# Patient Record
Sex: FEMALE | Race: WHITE | Employment: FULL TIME | URBAN - METROPOLITAN AREA
[De-identification: names, ages, dates, MRNs, and addresses within clinical notes are randomized per-mention and may not be internally consistent; named-entity substitution may affect disease eponyms.]

---

## 2017-09-12 ENCOUNTER — TELEPHONE (OUTPATIENT)
Dept: UROLOGY | Facility: HOSPITAL | Age: 47
End: 2017-09-12

## 2017-09-12 RX ORDER — LOSARTAN POTASSIUM 50 MG/1
25 TABLET ORAL DAILY
COMMUNITY

## 2017-09-12 NOTE — TELEPHONE ENCOUNTER
History received and processed - referred by Dr Momo Davis for KIEL and UUI - no previous treatment and is interested in surgical correction

## 2017-09-20 ENCOUNTER — OFFICE VISIT (OUTPATIENT)
Dept: UROLOGY | Facility: HOSPITAL | Age: 47
End: 2017-09-20
Attending: OBSTETRICS & GYNECOLOGY
Payer: COMMERCIAL

## 2017-09-20 VITALS
SYSTOLIC BLOOD PRESSURE: 130 MMHG | WEIGHT: 168 LBS | BODY MASS INDEX: 31.72 KG/M2 | HEIGHT: 61 IN | DIASTOLIC BLOOD PRESSURE: 80 MMHG

## 2017-09-20 DIAGNOSIS — N39.3 FEMALE STRESS INCONTINENCE: Primary | ICD-10-CM

## 2017-09-20 DIAGNOSIS — N39.41 URGE INCONTINENCE: ICD-10-CM

## 2017-09-20 PROCEDURE — 99201 HC OUTPT EVAL AND MGNT NEW PT LEVEL 1: CPT

## 2017-09-20 RX ORDER — MAG HYDROX/ALUMINUM HYD/SIMETH 400-400-40
SUSPENSION, ORAL (FINAL DOSE FORM) ORAL
COMMUNITY

## 2017-09-20 RX ORDER — MAGNESIUM OXIDE 400 MG (241.3 MG MAGNESIUM) TABLET
800 TABLET DAILY
COMMUNITY

## 2017-10-06 ENCOUNTER — OFFICE VISIT (OUTPATIENT)
Dept: UROLOGY | Facility: HOSPITAL | Age: 47
End: 2017-10-06
Attending: OBSTETRICS & GYNECOLOGY
Payer: COMMERCIAL

## 2017-10-06 VITALS
SYSTOLIC BLOOD PRESSURE: 122 MMHG | WEIGHT: 168 LBS | DIASTOLIC BLOOD PRESSURE: 74 MMHG | HEIGHT: 61 IN | BODY MASS INDEX: 31.72 KG/M2

## 2017-10-06 DIAGNOSIS — N39.3 SUI (STRESS URINARY INCONTINENCE, FEMALE): Primary | ICD-10-CM

## 2017-10-06 PROCEDURE — 51797 INTRAABDOMINAL PRESSURE TEST: CPT

## 2017-10-06 PROCEDURE — 51784 ANAL/URINARY MUSCLE STUDY: CPT

## 2017-10-06 PROCEDURE — 51741 ELECTRO-UROFLOWMETRY FIRST: CPT

## 2017-10-06 PROCEDURE — 51729 CYSTOMETROGRAM W/VP&UP: CPT

## 2017-10-06 NOTE — PROCEDURES
.Patient here for urodynamic testing. Procedure explained and confirmed by patient. See evaluation form for results. Both verbal and written discharge instructions were given.   Patient tolerated procedure well and will follow up with Dr. Sruthi Hogan on tdoc  Abd Catheter:     Fr 7 / tdoc   Infusion:  Water Rate 50 mL/min  Temp:  Room  Position:  [x]  Sit  []  Stand  []  Supine  First sensation:   58 mL  First desire to void:   152 mL  Strong desire to void:  320 mL  Maximum cystometric capacity:   450 mL

## 2017-10-06 NOTE — PATIENT INSTRUCTIONS
[facility]  [phone]  Office: [phone]      Urodynamic Testing Discharge Instructions: There are NO dietary or activity restrictions. You may resume your normal schedule.       You may hav

## 2017-11-08 ENCOUNTER — OFFICE VISIT (OUTPATIENT)
Dept: UROLOGY | Facility: HOSPITAL | Age: 47
End: 2017-11-08
Attending: OBSTETRICS & GYNECOLOGY
Payer: COMMERCIAL

## 2017-11-08 VITALS
SYSTOLIC BLOOD PRESSURE: 122 MMHG | HEIGHT: 62 IN | WEIGHT: 170 LBS | BODY MASS INDEX: 31.28 KG/M2 | DIASTOLIC BLOOD PRESSURE: 84 MMHG

## 2017-11-08 DIAGNOSIS — N39.3 SUI (STRESS URINARY INCONTINENCE, FEMALE): Primary | ICD-10-CM

## 2017-11-08 DIAGNOSIS — N39.41 URGE INCONTINENCE: ICD-10-CM

## 2017-11-08 DIAGNOSIS — N32.81 OAB (OVERACTIVE BLADDER): ICD-10-CM

## 2017-11-08 PROCEDURE — 99211 OFF/OP EST MAY X REQ PHY/QHP: CPT

## 2017-11-15 ENCOUNTER — ANESTHESIA EVENT (OUTPATIENT)
Dept: SURGERY | Facility: HOSPITAL | Age: 47
End: 2017-11-15
Payer: COMMERCIAL

## 2017-11-15 ENCOUNTER — APPOINTMENT (OUTPATIENT)
Dept: LAB | Facility: HOSPITAL | Age: 47
End: 2017-11-15
Attending: OBSTETRICS & GYNECOLOGY
Payer: COMMERCIAL

## 2017-11-15 DIAGNOSIS — N39.3 STRESS INCONTINENCE: ICD-10-CM

## 2017-11-15 DIAGNOSIS — I10 HTN (HYPERTENSION): ICD-10-CM

## 2017-11-15 PROCEDURE — 36415 COLL VENOUS BLD VENIPUNCTURE: CPT

## 2017-11-15 PROCEDURE — 80048 BASIC METABOLIC PNL TOTAL CA: CPT

## 2017-11-15 PROCEDURE — 93005 ELECTROCARDIOGRAM TRACING: CPT

## 2017-11-15 PROCEDURE — 93010 ELECTROCARDIOGRAM REPORT: CPT | Performed by: INTERNAL MEDICINE

## 2017-11-17 ENCOUNTER — SURGERY (OUTPATIENT)
Age: 47
End: 2017-11-17

## 2017-11-17 ENCOUNTER — ANESTHESIA (OUTPATIENT)
Dept: SURGERY | Facility: HOSPITAL | Age: 47
End: 2017-11-17
Payer: COMMERCIAL

## 2017-11-17 ENCOUNTER — HOSPITAL ENCOUNTER (OUTPATIENT)
Facility: HOSPITAL | Age: 47
Setting detail: HOSPITAL OUTPATIENT SURGERY
Discharge: HOME | End: 2017-11-17
Attending: OBSTETRICS & GYNECOLOGY | Admitting: OBSTETRICS & GYNECOLOGY
Payer: COMMERCIAL

## 2017-11-17 VITALS
RESPIRATION RATE: 18 BRPM | HEART RATE: 75 BPM | BODY MASS INDEX: 30.31 KG/M2 | DIASTOLIC BLOOD PRESSURE: 82 MMHG | HEIGHT: 62 IN | OXYGEN SATURATION: 98 % | TEMPERATURE: 99 F | SYSTOLIC BLOOD PRESSURE: 124 MMHG | WEIGHT: 164.69 LBS

## 2017-11-17 DIAGNOSIS — I10 HTN (HYPERTENSION): ICD-10-CM

## 2017-11-17 DIAGNOSIS — N39.3 STRESS INCONTINENCE: Primary | ICD-10-CM

## 2017-11-17 PROCEDURE — 0TSD0ZZ REPOSITION URETHRA, OPEN APPROACH: ICD-10-PCS | Performed by: OBSTETRICS & GYNECOLOGY

## 2017-11-17 PROCEDURE — 81025 URINE PREGNANCY TEST: CPT | Performed by: OBSTETRICS & GYNECOLOGY

## 2017-11-17 PROCEDURE — 0TBD0ZZ EXCISION OF URETHRA, OPEN APPROACH: ICD-10-PCS | Performed by: OBSTETRICS & GYNECOLOGY

## 2017-11-17 PROCEDURE — 88305 TISSUE EXAM BY PATHOLOGIST: CPT | Performed by: OBSTETRICS & GYNECOLOGY

## 2017-11-17 DEVICE — TRANSVAGINAL MID-URETHRAL SLING
Type: IMPLANTABLE DEVICE | Status: FUNCTIONAL
Brand: ADVANTAGE FIT™  SYSTEM

## 2017-11-17 RX ORDER — SODIUM CHLORIDE, SODIUM LACTATE, POTASSIUM CHLORIDE, CALCIUM CHLORIDE 600; 310; 30; 20 MG/100ML; MG/100ML; MG/100ML; MG/100ML
INJECTION, SOLUTION INTRAVENOUS CONTINUOUS
Status: DISCONTINUED | OUTPATIENT
Start: 2017-11-17 | End: 2017-11-17

## 2017-11-17 RX ORDER — ONDANSETRON 2 MG/ML
4 INJECTION INTRAMUSCULAR; INTRAVENOUS AS NEEDED
Status: DISCONTINUED | OUTPATIENT
Start: 2017-11-17 | End: 2017-11-17

## 2017-11-17 RX ORDER — NALOXONE HYDROCHLORIDE 0.4 MG/ML
80 INJECTION, SOLUTION INTRAMUSCULAR; INTRAVENOUS; SUBCUTANEOUS AS NEEDED
Status: DISCONTINUED | OUTPATIENT
Start: 2017-11-17 | End: 2017-11-17

## 2017-11-17 RX ORDER — CEFAZOLIN SODIUM 1 G/3ML
INJECTION, POWDER, FOR SOLUTION INTRAMUSCULAR; INTRAVENOUS
Status: DISCONTINUED | OUTPATIENT
Start: 2017-11-17 | End: 2017-11-17 | Stop reason: HOSPADM

## 2017-11-17 RX ORDER — IBUPROFEN 600 MG/1
600 TABLET ORAL EVERY 6 HOURS
Qty: 40 TABLET | Refills: 2 | Status: SHIPPED
Start: 2017-11-17 | End: 2018-01-12 | Stop reason: ALTCHOICE

## 2017-11-17 RX ORDER — HYDROCODONE BITARTRATE AND ACETAMINOPHEN 5; 325 MG/1; MG/1
1 TABLET ORAL AS NEEDED
Status: COMPLETED | OUTPATIENT
Start: 2017-11-17 | End: 2017-11-17

## 2017-11-17 RX ORDER — METOCLOPRAMIDE HYDROCHLORIDE 5 MG/ML
10 INJECTION INTRAMUSCULAR; INTRAVENOUS AS NEEDED
Status: DISCONTINUED | OUTPATIENT
Start: 2017-11-17 | End: 2017-11-17

## 2017-11-17 RX ORDER — HYDROMORPHONE HYDROCHLORIDE 1 MG/ML
0.4 INJECTION, SOLUTION INTRAMUSCULAR; INTRAVENOUS; SUBCUTANEOUS EVERY 5 MIN PRN
Status: DISCONTINUED | OUTPATIENT
Start: 2017-11-17 | End: 2017-11-17

## 2017-11-17 RX ORDER — HYDROCODONE BITARTRATE AND ACETAMINOPHEN 5; 325 MG/1; MG/1
2 TABLET ORAL AS NEEDED
Status: COMPLETED | OUTPATIENT
Start: 2017-11-17 | End: 2017-11-17

## 2017-11-17 RX ORDER — HYDROCODONE BITARTRATE AND ACETAMINOPHEN 5; 325 MG/1; MG/1
1-2 TABLET ORAL EVERY 6 HOURS PRN
Qty: 30 TABLET | Refills: 0 | Status: SHIPPED
Start: 2017-11-17 | End: 2018-01-12

## 2017-11-17 RX ORDER — DEXAMETHASONE SODIUM PHOSPHATE 4 MG/ML
4 VIAL (ML) INJECTION AS NEEDED
Status: DISCONTINUED | OUTPATIENT
Start: 2017-11-17 | End: 2017-11-17

## 2017-11-17 NOTE — ANESTHESIA POSTPROCEDURE EVALUATION
Haresh [facility] Patient Status:  Hospital Outpatient Surgery   Age/Gender 41year old female MRN [id number]   [facility] SURGERY Attending Johny Cunningham MD   Hosp Day # 0 PCP MASHA WADE, Maria L Gutierrez MD       Anesthesia Post-op

## 2017-11-17 NOTE — H&P
[facility] Patient Status:  [facility] Outpatient Surgery    1970 MRN [id number]   Location [facility] Attending Ruby Graves MD   Hosp Day # 0 PCP MASHA WADE, Srinivasan Berger MD     Date o Blood pressure 119/81, pulse 66, temperature 98.8 °F (37.1 °C), resp. rate 16, height 62\", weight 164 lb 10.9 oz (74.7 kg), last menstrual period 10/22/2017, SpO2 99 %. HEENT: Exam is unremarkable. Without scleral icterus.   Mucous membranes are moist. P

## 2017-11-17 NOTE — PROGRESS NOTES
Patient voided 100cc of clear yellow urine at 1140. Bladder scan completed immediately after with a residual of 35ml. Patient denied any discomfort or further urge to urinate. Patient voided an additional 300ml of clear yellow urine at this time.  [facility]

## 2017-11-17 NOTE — OPERATIVE REPORT
Pre-Operative Diagnosis: Stress Incontinence    Post-Operative Diagnosis: Same with suburethral cyst.    Procedure Performed:  Excision of suburethral cyst;  Midurethral sling; cystourethroscopy    Surgeon:  Ashley Palomares MD    Anesthesia: General    Speci hemostasis.   Skin glue was applied to the suprapubic incisions and the patient was then removed from the dorsal lithotomy position, awakened, extubated and transferred from the operating room to the recovery room in stable condition, having tolerated the p

## 2017-11-17 NOTE — ANESTHESIA PREPROCEDURE EVALUATION
PRE-OP EVALUATION    Patient Name: Mark Dinh    Pre-op Diagnosis: STRESS INCONTINENCE    Procedure(s):  PLACEMENT OF MID-URETHRAL SLING, CYSTOSCOPY    Surgeon(s) and Role:     * Olu Foote MD - Primary    Pre-op vitals reviewed.   Temp: 98.8 °F 11/15/2017   GLU 77 11/15/2017   CA 9.1 11/15/2017            Airway      Mallampati: II  Mouth opening: >3 FB  TM distance: > 6 cm  Neck ROM: full Cardiovascular    Cardiovascular exam normal.  Rhythm: regular  Rate: normal     Dental    No notable dental

## 2017-11-30 ENCOUNTER — TELEPHONE (OUTPATIENT)
Dept: UROLOGY | Facility: HOSPITAL | Age: 47
End: 2017-11-30

## 2017-11-30 NOTE — TELEPHONE ENCOUNTER
Pt calling for pathology results from a cyst removed during surgery. Read pt patho results. Pt states she is doing fine, no problems. Having regular BMs. Denies fever or pain. F/u w/ Dr Avelar File in 6 more weeks.

## 2018-01-12 ENCOUNTER — OFFICE VISIT (OUTPATIENT)
Dept: UROLOGY | Facility: HOSPITAL | Age: 48
End: 2018-01-12
Attending: OBSTETRICS & GYNECOLOGY
Payer: COMMERCIAL

## 2018-01-12 VITALS
HEIGHT: 62 IN | SYSTOLIC BLOOD PRESSURE: 122 MMHG | WEIGHT: 164 LBS | DIASTOLIC BLOOD PRESSURE: 80 MMHG | BODY MASS INDEX: 30.18 KG/M2

## 2018-01-12 DIAGNOSIS — Z98.890 POST-OPERATIVE STATE: Primary | ICD-10-CM

## 2018-01-12 PROCEDURE — 99211 OFF/OP EST MAY X REQ PHY/QHP: CPT

## 2018-02-26 ENCOUNTER — TELEPHONE (OUTPATIENT)
Dept: UROLOGY | Facility: HOSPITAL | Age: 48
End: 2018-02-26

## 2018-02-26 DIAGNOSIS — R30.0 BURNING WITH URINATION: Primary | ICD-10-CM

## 2018-02-27 ENCOUNTER — APPOINTMENT (OUTPATIENT)
Dept: LAB | Age: 48
End: 2018-02-27
Attending: OBSTETRICS & GYNECOLOGY
Payer: COMMERCIAL

## 2018-02-27 DIAGNOSIS — R30.0 BURNING WITH URINATION: ICD-10-CM

## 2018-02-27 PROCEDURE — 87086 URINE CULTURE/COLONY COUNT: CPT

## 2018-02-28 ENCOUNTER — TELEPHONE (OUTPATIENT)
Dept: UROLOGY | Facility: HOSPITAL | Age: 48
End: 2018-02-28

## 2018-03-02 ENCOUNTER — OFFICE VISIT (OUTPATIENT)
Dept: UROLOGY | Facility: HOSPITAL | Age: 48
End: 2018-03-02
Attending: OBSTETRICS & GYNECOLOGY
Payer: COMMERCIAL

## 2018-03-02 VITALS
HEIGHT: 62 IN | DIASTOLIC BLOOD PRESSURE: 74 MMHG | SYSTOLIC BLOOD PRESSURE: 114 MMHG | WEIGHT: 164 LBS | BODY MASS INDEX: 30.18 KG/M2

## 2018-03-02 DIAGNOSIS — Z98.890 POST-OPERATIVE STATE: Primary | ICD-10-CM

## 2018-03-02 DIAGNOSIS — B37.9 YEAST INFECTION: ICD-10-CM

## 2018-03-02 PROCEDURE — 99211 OFF/OP EST MAY X REQ PHY/QHP: CPT

## 2018-03-02 RX ORDER — FLUCONAZOLE 150 MG/1
150 TABLET ORAL ONCE
Qty: 2 TABLET | Refills: 0 | Status: SHIPPED | OUTPATIENT
Start: 2018-03-02 | End: 2018-03-02

## 2018-09-05 ENCOUNTER — OFFICE VISIT (OUTPATIENT)
Dept: UROLOGY | Facility: HOSPITAL | Age: 48
End: 2018-09-05
Attending: OBSTETRICS & GYNECOLOGY
Payer: COMMERCIAL

## 2018-09-05 VITALS
HEIGHT: 62 IN | SYSTOLIC BLOOD PRESSURE: 104 MMHG | DIASTOLIC BLOOD PRESSURE: 70 MMHG | WEIGHT: 164 LBS | BODY MASS INDEX: 30.18 KG/M2

## 2018-09-05 DIAGNOSIS — R35.0 URINARY FREQUENCY: ICD-10-CM

## 2018-09-05 DIAGNOSIS — Z98.890 POST-OPERATIVE STATE: Primary | ICD-10-CM

## 2018-09-05 DIAGNOSIS — M79.18 MYALGIA OF PELVIC FLOOR: ICD-10-CM

## 2018-09-05 PROCEDURE — 99211 OFF/OP EST MAY X REQ PHY/QHP: CPT

## 2022-03-15 NOTE — TELEPHONE ENCOUNTER
Patient had MUS and CYSTO 11/2017. Patient feels something might be in vagina and burning with urination. Bowels moving well. Patient at last visit had some double voiding but states has ceased.   Plan   Ordered urine culture patient to go to OP for cultur Patient requesting test results

## 2024-04-02 ENCOUNTER — HOSPITAL ENCOUNTER (OUTPATIENT)
Dept: CT IMAGING | Age: 54
Discharge: HOME | End: 2024-04-02
Attending: FAMILY MEDICINE

## 2024-04-02 DIAGNOSIS — Z13.6 SCREENING FOR HEART DISEASE: ICD-10-CM

## 2024-04-02 NOTE — PROGRESS NOTES
Date of Service 4/2/2024    JACEK HO  Date of Birth 4/18/1970    Patient Age: 53 year old    PCP: Tate Rivas MD  [address]  [place] [address]    Heart Scan Consult  Preliminary Heart Scan Score: 0    Previous Screening  Heart Scan Completed Previously: No                   Risk Factors  Personal Risk Factors  Non-alterable Risk Factors: Family History  Alterable Risk Factors: Abnormal Cholesterol;Lack of exercise;High Blood Pressure      Body Mass Index  There is no height or weight on file to calculate BMI.    Blood Pressure  There were no vitals taken for this visit.  (Normal =< 120/80,  Elevated = 120-129/ >80,  High Stage1 130-139/80-89 , Stage2 >140/>90)    Lipid Profile  2/13/2024  Total - 218  LDL - 130  HDL - 69  Triglycerides - 86  No med.    Cholesterol Goals  Value   Total  =< 200   HDL  = > 45 Men = > 55 Women   LDL   =< 100   Triglycerides  =< 150       Glucose and Hemoglobin A1C  Lab Results   Component Value Date    GLU 77 11/15/2017     (Normal Fasting Glucose < 100mg/dl )    Nurse Review  Risk factor information and results reviewed with Nurse: Yes    Recommended Follow Up:  Consult your physician regarding:: Final Heart Scan Report;Discuss potential for Incidental Finding      Recommendations for Change:  Nutrition Changes: Low Saturated Fat;Low Fat Dairy;Increase Fiber    Cholesterol Modification (goal of therapy depends upon your risk): Decrease LDL (Lousy/Bad) Ideal <100    Exercise: Enhance Current Program    Smoking Cessation: No Change Needed    Weight Management: Decrease Current Weight    Stress Management: Adopt Stress Management Techniques    Repeat Heart Scan: 5 years if Calcium Score is 0.0;Discuss with your Physician              [facility] Recommended Resources:  Recommended Resources: Upcoming Classes, Medical Services and Health Library [email or web address]            Virginia DALLAS RN        Please Contact the Nurse Heart Line with any Questions or  Concerns [phone].

## 2024-09-26 NOTE — TELEPHONE ENCOUNTER
Phoned pt w/ neg urine cx results. Pt still having dysuria. Plan for pt to come into to see Dr Mccain Co on next available appt. Pt verbalized an understanding.
DISCHARGE

## (undated) DEVICE — GLOVE SURG SENSICARE SZ 7-1/2

## (undated) DEVICE — GYN CDS: Brand: MEDLINE INDUSTRIES, INC.

## (undated) DEVICE — TUBING CYSTO

## (undated) DEVICE — #15 STERILE STAINLESS BLADE: Brand: STERILE STAINLESS BLADES

## (undated) DEVICE — SOL  .9 1000ML BTL

## (undated) DEVICE — DERMABOND LIQUID ADHESIVE

## (undated) DEVICE — KENDALL SCD EXPRESS SLEEVES, KNEE LENGTH, MEDIUM: Brand: KENDALL SCD

## (undated) DEVICE — SOL H2O 1000ML BTL

## (undated) DEVICE — VIOLET BRAIDED (POLYGLACTIN 910), SYNTHETIC ABSORBABLE SUTURE: Brand: COATED VICRYL

## (undated) NOTE — LETTER
Consent to Procedure/Sedation    Date: __10/6/2017_____    Time: ___8:40 AM ___    1. I authorize the performance upon Román Aaron the following:  Urodynamics (UDS)      2.  Amanda Chatman(and whomever is designated as the [employer] ___________________________    ___________________    Witness: ____________________     Date: ______________    Printed: 10/6/2017   8:40 AM    Patient Name: Leila Lawler        : 1970       Medical Record #: [id number]